# Patient Record
Sex: FEMALE | Race: WHITE | NOT HISPANIC OR LATINO | Employment: OTHER | URBAN - METROPOLITAN AREA
[De-identification: names, ages, dates, MRNs, and addresses within clinical notes are randomized per-mention and may not be internally consistent; named-entity substitution may affect disease eponyms.]

---

## 2019-04-01 PROBLEM — K21.9 GERD (GASTROESOPHAGEAL REFLUX DISEASE): Status: ACTIVE | Noted: 2019-04-01

## 2019-04-01 PROBLEM — C50.912 MALIGNANT NEOPLASM OF LEFT FEMALE BREAST: Status: ACTIVE | Noted: 2019-04-01

## 2019-04-01 PROBLEM — I50.21 ACUTE SYSTOLIC (CONGESTIVE) HEART FAILURE: Status: ACTIVE | Noted: 2019-04-01

## 2019-04-02 PROBLEM — E87.6 HYPOKALEMIA: Status: ACTIVE | Noted: 2019-04-02

## 2019-04-03 PROBLEM — I50.31 ACUTE DIASTOLIC (CONGESTIVE) HEART FAILURE: Status: ACTIVE | Noted: 2019-04-01

## 2019-04-04 ENCOUNTER — TELEPHONE (OUTPATIENT)
Dept: CARDIOLOGY | Facility: CLINIC | Age: 79
End: 2019-04-04

## 2019-04-04 NOTE — TELEPHONE ENCOUNTER
----- Message from Marti Jerez sent at 4/4/2019  2:06 PM CDT -----  Contact: [facility]  Type:  Sooner Apoointment Request    Caller is requesting a sooner appointment.  Caller declined first available appointment listed below.  Caller will not accept being placed on the waitlist and is requesting a message be sent to doctor.    Name of Caller:  [facility]  When is the first available appointment?  7/3/19  Symptoms:  Needs two week follow up from stay [facility] discharge 4/4/19; Dx angiogram  Best Call Back Number:  [phone]  Additional Information:  Please call patient with appointment. Thanks!

## 2019-07-08 ENCOUNTER — TELEPHONE (OUTPATIENT)
Dept: CARDIOLOGY | Facility: CLINIC | Age: 79
End: 2019-07-08

## 2019-07-08 NOTE — TELEPHONE ENCOUNTER
----- Message from Marti Jerez sent at 7/8/2019  1:27 PM CDT -----  Contact: Grace  Type: Needs Medical Advice    Who Called:  patient  Best Call Back Number: [phone]  Additional Information:  Calling as has hives and also has an appointment scheduled for Wednesday 7/10/19. Question is does Dr Trevino want to see her with this situation going on or needs to reschedule. Thanks!

## 2019-07-10 ENCOUNTER — OFFICE VISIT (OUTPATIENT)
Dept: CARDIOLOGY | Facility: CLINIC | Age: 79
End: 2019-07-10
Payer: MEDICARE

## 2019-07-10 VITALS
DIASTOLIC BLOOD PRESSURE: 81 MMHG | BODY MASS INDEX: 28.75 KG/M2 | HEART RATE: 69 BPM | SYSTOLIC BLOOD PRESSURE: 131 MMHG | HEIGHT: 63 IN | WEIGHT: 162.25 LBS

## 2019-07-10 DIAGNOSIS — R94.39 ABNORMAL NUCLEAR STRESS TEST: Primary | ICD-10-CM

## 2019-07-10 DIAGNOSIS — I70.0 ATHEROSCLEROSIS OF AORTA: ICD-10-CM

## 2019-07-10 DIAGNOSIS — R09.02 HYPOXIA: ICD-10-CM

## 2019-07-10 PROCEDURE — 1101F PT FALLS ASSESS-DOCD LE1/YR: CPT | Mod: CPTII,S$GLB,, | Performed by: INTERNAL MEDICINE

## 2019-07-10 PROCEDURE — 99214 PR OFFICE/OUTPT VISIT, EST, LEVL IV, 30-39 MIN: ICD-10-PCS | Mod: S$GLB,,, | Performed by: INTERNAL MEDICINE

## 2019-07-10 PROCEDURE — 99999 PR PBB SHADOW E&M-EST. PATIENT-LVL III: CPT | Mod: PBBFAC,,, | Performed by: INTERNAL MEDICINE

## 2019-07-10 PROCEDURE — 99999 PR PBB SHADOW E&M-EST. PATIENT-LVL III: ICD-10-PCS | Mod: PBBFAC,,, | Performed by: INTERNAL MEDICINE

## 2019-07-10 PROCEDURE — 99214 OFFICE O/P EST MOD 30 MIN: CPT | Mod: S$GLB,,, | Performed by: INTERNAL MEDICINE

## 2019-07-10 PROCEDURE — 1101F PR PT FALLS ASSESS DOC 0-1 FALLS W/OUT INJ PAST YR: ICD-10-PCS | Mod: CPTII,S$GLB,, | Performed by: INTERNAL MEDICINE

## 2019-07-10 RX ORDER — FUROSEMIDE 20 MG/1
20 TABLET ORAL DAILY PRN
Qty: 90 TABLET | Refills: 6 | Status: SHIPPED | OUTPATIENT
Start: 2019-07-10 | End: 2021-01-12

## 2019-07-10 RX ORDER — CARVEDILOL 3.12 MG/1
3.12 TABLET ORAL 2 TIMES DAILY
Qty: 180 TABLET | Refills: 6 | Status: SHIPPED | OUTPATIENT
Start: 2019-07-10 | End: 2020-08-24

## 2019-07-10 RX ORDER — TRIAMTERENE/HYDROCHLOROTHIAZID 37.5-25 MG
1 TABLET ORAL DAILY
Qty: 90 TABLET | Refills: 5 | Status: SHIPPED | OUTPATIENT
Start: 2019-07-10 | End: 2019-07-10 | Stop reason: SDUPTHER

## 2019-07-10 RX ORDER — CARVEDILOL 3.12 MG/1
3.12 TABLET ORAL 2 TIMES DAILY
Qty: 180 TABLET | Refills: 6 | Status: SHIPPED | OUTPATIENT
Start: 2019-07-10 | End: 2019-07-10 | Stop reason: SDUPTHER

## 2019-07-10 RX ORDER — TRIAMTERENE/HYDROCHLOROTHIAZID 37.5-25 MG
1 TABLET ORAL DAILY
Qty: 90 TABLET | Refills: 5 | Status: SHIPPED | OUTPATIENT
Start: 2019-07-10 | End: 2021-01-12

## 2019-07-10 NOTE — PROGRESS NOTES
Subjective:    Patient ID:  Grace Herrera is a 78 y.o. female who presents for follow-up of CHF F/U      HPI  Here for follow up of recent hospital stay due to CHF with neg [facility]. Still has LOPEZ. No angina. Mild LE edema with increased Na use.       Review of Systems   Constitution: Negative for malaise/fatigue.   Eyes: Negative for blurred vision.   Cardiovascular: Negative for chest pain, claudication, cyanosis, dyspnea on exertion, irregular heartbeat, leg swelling, near-syncope, orthopnea, palpitations, paroxysmal nocturnal dyspnea and syncope.   Respiratory: Negative for cough and shortness of breath.    Hematologic/Lymphatic: Does not bruise/bleed easily.   Musculoskeletal: Negative for back pain, falls, joint pain, muscle cramps, muscle weakness and myalgias.   Gastrointestinal: Negative for abdominal pain, change in bowel habit, nausea and vomiting.   Genitourinary: Negative for urgency.   Neurological: Negative for dizziness, focal weakness and light-headedness.       Past Medical History:   Diagnosis Date    Abnormal nuclear stress test     4/19 [facility]- [place]    Arthritis     Breast cancer in female     Chronic low back pain     Depression     Encounter for blood transfusion     GERD (gastroesophageal reflux disease)     Hypertension     Insomnia     Malignant neoplasm of left female breast 4/1/2019    Osteoporosis           Objective:     Vitals:    07/10/19 0813   BP: 131/81   Pulse: 69        Physical Exam   Constitutional: She is oriented to person, place, and time. She appears well-developed and well-nourished.   Neck: Normal range of motion. No JVD present.   Cardiovascular: Normal rate, regular rhythm, normal heart sounds and intact distal pulses.   Pulmonary/Chest: Effort normal and breath sounds normal.   Neurological: She is alert and oriented to person, place, and time.   Skin: Skin is warm and dry.   Psychiatric: She has a normal mood and affect.   Nursing note and vitals reviewed.             ..    Chemistry        Component Value Date/Time     04/03/2019 0626    K 3.4 (L) 04/03/2019 0626     04/03/2019 0626    CO2 27 04/03/2019 0626    BUN 23 (H) 04/03/2019 0626    CREATININE 1.09 04/03/2019 0626     04/03/2019 0626        Component Value Date/Time    CALCIUM 8.3 (L) 04/03/2019 0626    ALKPHOS 64 04/01/2019 1836    AST 22 04/01/2019 1836    ALT 12 04/01/2019 1836    BILITOT 0.6 04/01/2019 1836    ESTGFRAFRICA 55 (A) 04/03/2019 0626    EGFRNONAA 48 (A) 04/03/2019 0626            ..  Lab Results   Component Value Date    CHOL 231 (H) 04/01/2019     Lab Results   Component Value Date    HDL 51 04/01/2019     Lab Results   Component Value Date    LDLCALC 134.2 04/01/2019     Lab Results   Component Value Date    TRIG 229 (H) 04/01/2019     Lab Results   Component Value Date    CHOLHDL 22.1 04/01/2019     ..  Lab Results   Component Value Date    WBC 7.71 04/01/2019    HGB 11.5 (L) 04/01/2019    HCT 37.5 04/01/2019    MCV 87 04/01/2019     04/01/2019       Test(s) Reviewed  I have reviewed the following in detail:  [] Stress test   [] Angiography   [] Echocardiogram   [x] Labs   [] Other:       Assessment:         ICD-10-CM ICD-9-CM   1. Abnormal nuclear stress test R94.39 794.39   2. Hypoxia R09.02 799.02     Problem List Items Addressed This Visit     Hypoxia    Abnormal nuclear stress test - Primary    Overview     4/19 [facility]- [place]                Plan:           Return to clinic 9 months   Low level/low impact aerobic exercise 5x's/wk. Heart healthy diet and risk factor modification.    See labs and med orders.  Decrease coreg add triamterene  Educated on titratration of diuretic to 2 lb weight gain in 24 hrs, control salt intake and leg elevation.     Portions of this note may have been created with voice recognition software.  Grammatical, syntax and spelling errors may be inevitable.

## 2020-05-12 ENCOUNTER — TELEPHONE (OUTPATIENT)
Dept: CARDIOLOGY | Facility: CLINIC | Age: 80
End: 2020-05-12

## 2020-08-24 RX ORDER — CARVEDILOL 3.12 MG/1
TABLET ORAL
Qty: 180 TABLET | Refills: 4 | Status: SHIPPED | OUTPATIENT
Start: 2020-08-24 | End: 2020-12-22

## 2020-09-22 NOTE — PROGRESS NOTES
"  INITIAL CONSULTATION     DATE: 09/24/2020  Patient Name: Grace Herrera  Referred by: Jose Elias Monzon Jr., MD  Reason for referral: breast cancer      Subjective:       Patient ID: Grace Herrera is a 79 y.o. female.    Chief Complaint: Establish Care    HPI     Oncology and medical history for review prior to visit:  01/24/2013-lung biopsy - granulomatous disease  -   2/2012 - abnormal MMG   04/01/2012-left breast lumpectomy and ALND -   pT1c N3a - ER+, MS-, HER2- Low proliferative rate  Dose dense chemo - peripehral neuroapthy and docetaxol substituted for paclitaxel. Patient reports at initial visit  S/p radiation  2013 - started anastrozole    03/03/2020-medical oncology visit-mention of dyspnea associated with heart failure managed by cardiology, stable weight, urticaria times year that has improved.  She remains on anastrozole, denosumab, calcium and vitamin-D    [facility]   She presents today to establish care closer to home for her breast cancer care.   Remains on arimidex with plan indefinitely.   She reports stopped prolia, this ws very expensive so she stopped. States she was on two years, stopped prior to 3/2020.  She does take Vit D 50,000 IU once a week.     Reports her biggest issue is:  Urticaria/hives - started 2016. She reports on and off since then  - had been to allergist and 2 dermatologist. Take antihistamines is what has been recommended but she reports cannot take these, she has ended up in the hopsital from this before. She had passing out episode and went to hospital she attributes to the anti-histamine. She reports very frustrated with this and is tearful, itches so bad she scratches and bleed. She reports has tried topical and has tried "everything". Steroids helps very short duration. She reports she cant sleep due to this as well. She gets 2 hours of sleep at night and awake with itching. She reports her back itching gets so severe as well. She " "states she has not had allergy testing and has thought about seeing someone else. Also head and neck - scratches until she bleeds. She is very upset about this today and encouraged    She reports limited ROM left shoulder related to surgery, both shoulders "frozen", she declines to see a doctor, very limited mobility.    She reports has had an angiogram in past with minimal blockage. She has CHF that is "mild" per her report. She reports limited on activity - walking to mail box she gets SOB. She monitors salt intake and low water intake. She does get swelling of the ankles. On maxide.     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   No smoking or drinking.   Has a son she is close to who lives in [place]. Her daughter lives there also as well as second son.     Past Medical History:   Diagnosis Date    Abnormal nuclear stress test     4/19 [facility] [place]    Arthritis     Breast cancer in female     Chronic low back pain     Depression     Encounter for blood transfusion     GERD (gastroesophageal reflux disease)     Hypertension     Insomnia     Malignant neoplasm of left female breast 4/1/2019    Osteoporosis      Past Surgical History:   Procedure Laterality Date    ADENOIDECTOMY      APPENDECTOMY      BREAST LUMPECTOMY Left     CHOLECYSTECTOMY      CORONARY ANGIOGRAPHY N/A 4/4/2019    Procedure: ANGIOGRAM, CORONARY ARTERY;  Surgeon: You Trevino MD;  Location: [facility];  Service: Cardiology;  Laterality: N/A;    EYE SURGERY      HYSTERECTOMY      JOINT REPLACEMENT Bilateral     knees    LYMPH NODE BIOPSY      SURGICAL REMOVAL OF LYMPH NODE Left     near left breast    TONSILLECTOMY       Social History     Socioeconomic History    Marital status: Single     Spouse name: Not on file    Number of children: Not on file    Years of education: Not on file    " Highest education level: Not on file   Occupational History    Not on file   Social Needs    Financial resource strain: Not on file    Food insecurity     Worry: Not on file     Inability: Not on file    Transportation needs     Medical: Not on file     Non-medical: Not on file   Tobacco Use    Smoking status: Former Smoker     Types: Cigarettes    Smokeless tobacco: Never Used   Substance and Sexual Activity    Alcohol use: Not Currently    Drug use: Never    Sexual activity: Not on file   Lifestyle    Physical activity     Days per week: Not on file     Minutes per session: Not on file    Stress: Not on file   Relationships    Social connections     Talks on phone: Not on file     Gets together: Not on file     Attends Yazdanism service: Not on file     Active member of club or organization: Not on file     Attends meetings of clubs or organizations: Not on file     Relationship status: Not on file   Other Topics Concern    Not on file   Social History Narrative    Not on file     History reviewed. No pertinent family history.    Current Outpatient Medications   Medication Sig Dispense Refill    acetaminophen (TYLENOL EXTRA STRENGTH) 500 MG tablet Take 500 mg by mouth every 6 (six) hours as needed for Pain.      anastrozole (ARIMIDEX) 1 mg Tab Take 1 mg by mouth once daily.      carvediloL (COREG) 3.125 MG tablet TAKE 1 TABLET TWICE DAILY 180 tablet 4    cetirizine (ZYRTEC) 10 MG tablet Take 10 mg by mouth daily as needed for Allergies.      ergocalciferol (ERGOCALCIFEROL) 50,000 unit Cap Take 50,000 Units by mouth every Wednesday.      esomeprazole (NEXIUM) 40 MG capsule Take 1 capsule by mouth before breakfast.      ibuprofen (ADVIL,MOTRIN) 200 MG tablet Take 200 mg by mouth every 6 (six) hours as needed for Pain.      LORazepam (ATIVAN) 1 MG tablet Take 1 mg by mouth nightly as needed for Insomnia.      multivit-min/iron/folic/lutein (CENTRUM SILVER WOMEN ORAL) Take by mouth once daily.       prochlorperazine (COMPAZINE) 10 MG tablet Take 10 mg by mouth daily as needed.      aspirin (ECOTRIN) 81 MG EC tablet Take 1 tablet (81 mg total) by mouth once daily.  0    biotin 1 mg Cap Take 5,000 mcg by mouth daily as needed.      denosumab (PROLIA) 60 mg/mL Syrg Inject 60 mg into the skin.      furosemide (LASIX) 20 MG tablet Take 1 tablet (20 mg total) by mouth daily as needed. 90 tablet 6    sertraline (ZOLOFT) 50 MG tablet Take 50 mg by mouth once daily.      triamterene-hydrochlorothiazide 37.5-25 mg (MAXZIDE-25) 37.5-25 mg per tablet Take 1 tablet by mouth once daily. 90 tablet 5     No current facility-administered medications for this visit.      ALLERGIES REVIEWED    Review of Systems    Constitutional: Negative for activity change, appetite change, positive fatigue, fever and unexpected weight change.   HENT: Negative for mouth sores and sore throat.    Respiratory: Negative for cough and +shortness of breath.    Cardiovascular: Negative for chest pain, palpitations and +leg swelling.   Gastrointestinal: Negative for abdominal pain, constipation and diarrhea.   Genitourinary: Negative for dysuria and frequency.   Musculoskeletal: +for back pain and joint swelling.   Neurological: +for weakness, light-headedness and numbness.   Hematological: Does not bruise/bleed easily.   Skin: no rash, no lesions, no itching    Objective:      BP (!) 152/85 (BP Location: Right arm, Patient Position: Sitting)   Pulse 87   Temp 97.4 °F (36.3 °C) (Temporal)   Wt 70.5 kg (155 lb 6.8 oz)   SpO2 96%   BMI 26.68 kg/m²    Wt Readings from Last 25 Encounters:   09/23/20 70.5 kg (155 lb 6.8 oz)   08/07/20 68 kg (150 lb)   11/28/19 71.8 kg (158 lb 4.6 oz)   07/10/19 73.6 kg (162 lb 4.1 oz)   04/04/19 75.3 kg (166 lb 0.1 oz)       Physical Exam    Constitutional: patient is oriented to person, place, and time. No distress. Well appearing. Overweight.   HENT: Mouth/Throat: Oropharynx is clear and moist. No  oropharyngeal exudate.   Eyes: Conjunctivae and EOM are normal.   Neck: Normal range of motion. Neck supple.   Cardiovascular: Normal rate, regular rhythm, normal heart sounds and intact distal pulses.    Pulmonary/Chest: Effort normal and breath sounds normal. she has no wheezes.   has no rales.    Abdominal: Soft. Bowel sounds are normal. he exhibits no distension. There is no tenderness.   Musculoskeletal: patient exhibits no edema or tenderness.   Lymphadenopathy: Pt has no cervical adenopathy. no supraclavicular adenopathy. No axillary LAD.   Neurological: Pt is alert and oriented to person, place, and time. normal strength. No cranial nerve deficit or sensory deficit.   Skin: Skin is warm and dry. No rash noted. No erythema. Excoriations to back due to urticaria noted.   Psychiatric: Pt has a normal mood and affect. speech is normal.   Nursing note and vitals reviewed.      Assessment:       1. History of breast cancer    2. SOB (shortness of breath)    3. Chronic heart failure, unspecified heart failure type    4. Urticaria        ECOG SCORE         ECOG 1    Patient is a  79 y.o. female here with diagnosis breast cancer in 2012, locally advanced stage III ER/VA+. She remains with PEDRO on AI which is good. She declines further bone directed therapy, stopped prolia, but we did discuss potential benefit of zometa especially for breast cancer survivors, she is open to this and will discuss next visit, will continue Vit D/calcium    Reviewed her multiple medical problems and their mgmt. She feels there is no further way to optimize her chronic pain and debility with her back and shoulders, though this is very limiting in physical activity.    Her urticaria is a major issue with QOL and effects her every day and at night causing insmonia and she is tearful about this. Recommended continue follow-up with dermatology for further care, she is going to pursue this.           Plan:       Grace was seen today for  establish care.    Diagnoses and all orders for this visit:    History of breast cancer    SOB (shortness of breath)    Chronic heart failure, unspecified heart failure type    Urticaria            PLAN:    MMG at [facility] bilateral screening (hx left breast ca)    MD vis in 4 mo, January     Exercise/nutrition  Important to keep follow-up with PCP and cardiology    Discussed plan above in great detail with patient and all questions answered to their satisfaction. Proceed with plan above.       Thank you for the referral. Please call with any questions.           Tanya Ambrocio M.D.  Hematology Oncology  [facility]

## 2020-09-23 ENCOUNTER — OFFICE VISIT (OUTPATIENT)
Dept: HEMATOLOGY/ONCOLOGY | Facility: CLINIC | Age: 80
End: 2020-09-23
Payer: MEDICARE

## 2020-09-23 VITALS
BODY MASS INDEX: 26.68 KG/M2 | WEIGHT: 155.44 LBS | HEART RATE: 87 BPM | OXYGEN SATURATION: 96 % | DIASTOLIC BLOOD PRESSURE: 85 MMHG | SYSTOLIC BLOOD PRESSURE: 152 MMHG | TEMPERATURE: 97 F

## 2020-09-23 DIAGNOSIS — L50.9 URTICARIA: ICD-10-CM

## 2020-09-23 DIAGNOSIS — R06.02 SOB (SHORTNESS OF BREATH): ICD-10-CM

## 2020-09-23 DIAGNOSIS — Z85.3 HISTORY OF BREAST CANCER: Primary | ICD-10-CM

## 2020-09-23 DIAGNOSIS — I50.9 CHRONIC HEART FAILURE, UNSPECIFIED HEART FAILURE TYPE: ICD-10-CM

## 2020-09-23 PROCEDURE — 1159F MED LIST DOCD IN RCRD: CPT | Mod: S$GLB,,, | Performed by: INTERNAL MEDICINE

## 2020-09-23 PROCEDURE — 1159F PR MEDICATION LIST DOCUMENTED IN MEDICAL RECORD: ICD-10-PCS | Mod: S$GLB,,, | Performed by: INTERNAL MEDICINE

## 2020-09-23 PROCEDURE — 1125F PR PAIN SEVERITY QUANTIFIED, PAIN PRESENT: ICD-10-PCS | Mod: S$GLB,,, | Performed by: INTERNAL MEDICINE

## 2020-09-23 PROCEDURE — 1100F PTFALLS ASSESS-DOCD GE2>/YR: CPT | Mod: CPTII,S$GLB,, | Performed by: INTERNAL MEDICINE

## 2020-09-23 PROCEDURE — 99204 PR OFFICE/OUTPT VISIT, NEW, LEVL IV, 45-59 MIN: ICD-10-PCS | Mod: S$GLB,,, | Performed by: INTERNAL MEDICINE

## 2020-09-23 PROCEDURE — 3288F PR FALLS RISK ASSESSMENT DOCUMENTED: ICD-10-PCS | Mod: CPTII,S$GLB,, | Performed by: INTERNAL MEDICINE

## 2020-09-23 PROCEDURE — 99204 OFFICE O/P NEW MOD 45 MIN: CPT | Mod: S$GLB,,, | Performed by: INTERNAL MEDICINE

## 2020-09-23 PROCEDURE — 99999 PR PBB SHADOW E&M-EST. PATIENT-LVL IV: ICD-10-PCS | Mod: PBBFAC,,, | Performed by: INTERNAL MEDICINE

## 2020-09-23 PROCEDURE — 1125F AMNT PAIN NOTED PAIN PRSNT: CPT | Mod: S$GLB,,, | Performed by: INTERNAL MEDICINE

## 2020-09-23 PROCEDURE — 3288F FALL RISK ASSESSMENT DOCD: CPT | Mod: CPTII,S$GLB,, | Performed by: INTERNAL MEDICINE

## 2020-09-23 PROCEDURE — 99999 PR PBB SHADOW E&M-EST. PATIENT-LVL IV: CPT | Mod: PBBFAC,,, | Performed by: INTERNAL MEDICINE

## 2020-09-23 PROCEDURE — 1100F PR PT FALLS ASSESS DOC 2+ FALLS/FALL W/INJURY/YR: ICD-10-PCS | Mod: CPTII,S$GLB,, | Performed by: INTERNAL MEDICINE

## 2020-09-23 NOTE — LETTER
September 24, 2020      Jose Elias Monzon Jr., MD  [address]  [address], [address]  [place] [address]           [facility] [address], [address]  [facility] [address]  Phone: [phone]  Fax: [phone]          Patient: Grace Herrera   MR Number: [id number]   YOB: 1940   Date of Visit: 9/23/2020       Dear Dr. Jose Elias Monzon Jr.:    Thank you for referring Grace Herrera to me for evaluation. Attached you will find relevant portions of my assessment and plan of care.    If you have questions, please do not hesitate to call me. I look forward to following Grace Herrera along with you.    Sincerely,    Tanya Ambrocio MD    Enclosure  CC:  No Recipients    If you would like to receive this communication electronically, please contact [email or web address] or [phone] to request more information on [employer] access.    For providers and/or their staff who would like to refer a patient to [facility], please contact us through our one-stop-shop provider referral line, [facility], at [phone].    If you feel you have received this communication in error or would no longer like to receive these types of communications, please e-mail [email or web address]

## 2020-12-07 ENCOUNTER — TELEPHONE (OUTPATIENT)
Dept: CARDIOLOGY | Facility: CLINIC | Age: 80
End: 2020-12-07

## 2020-12-07 NOTE — TELEPHONE ENCOUNTER
----- Message from Monica Mariano sent at 12/7/2020  9:02 AM CST -----  Type: Needs Medical Advice  Who Called:  Patient  Best Call Back Number: [phone] (home)   Additional Information: the pt said she needs a nurse to call her she needs advice In regards to some test that she did have in Epic and she wants to add some test

## 2020-12-22 ENCOUNTER — OFFICE VISIT (OUTPATIENT)
Dept: CARDIOLOGY | Facility: CLINIC | Age: 80
End: 2020-12-22
Payer: MEDICARE

## 2020-12-22 VITALS
WEIGHT: 156.94 LBS | HEART RATE: 86 BPM | SYSTOLIC BLOOD PRESSURE: 151 MMHG | HEIGHT: 64 IN | DIASTOLIC BLOOD PRESSURE: 90 MMHG | BODY MASS INDEX: 26.79 KG/M2

## 2020-12-22 DIAGNOSIS — R94.39 ABNORMAL NUCLEAR STRESS TEST: Primary | ICD-10-CM

## 2020-12-22 DIAGNOSIS — I50.21 ACUTE SYSTOLIC (CONGESTIVE) HEART FAILURE: ICD-10-CM

## 2020-12-22 PROCEDURE — 99214 OFFICE O/P EST MOD 30 MIN: CPT | Mod: S$GLB,,, | Performed by: INTERNAL MEDICINE

## 2020-12-22 PROCEDURE — 99999 PR PBB SHADOW E&M-EST. PATIENT-LVL III: ICD-10-PCS | Mod: PBBFAC,,, | Performed by: INTERNAL MEDICINE

## 2020-12-22 PROCEDURE — 1159F PR MEDICATION LIST DOCUMENTED IN MEDICAL RECORD: ICD-10-PCS | Mod: S$GLB,,, | Performed by: INTERNAL MEDICINE

## 2020-12-22 PROCEDURE — 1126F AMNT PAIN NOTED NONE PRSNT: CPT | Mod: S$GLB,,, | Performed by: INTERNAL MEDICINE

## 2020-12-22 PROCEDURE — 1101F PR PT FALLS ASSESS DOC 0-1 FALLS W/OUT INJ PAST YR: ICD-10-PCS | Mod: CPTII,S$GLB,, | Performed by: INTERNAL MEDICINE

## 2020-12-22 PROCEDURE — 99214 PR OFFICE/OUTPT VISIT, EST, LEVL IV, 30-39 MIN: ICD-10-PCS | Mod: S$GLB,,, | Performed by: INTERNAL MEDICINE

## 2020-12-22 PROCEDURE — 1159F MED LIST DOCD IN RCRD: CPT | Mod: S$GLB,,, | Performed by: INTERNAL MEDICINE

## 2020-12-22 PROCEDURE — 3288F PR FALLS RISK ASSESSMENT DOCUMENTED: ICD-10-PCS | Mod: CPTII,S$GLB,, | Performed by: INTERNAL MEDICINE

## 2020-12-22 PROCEDURE — 99999 PR PBB SHADOW E&M-EST. PATIENT-LVL III: CPT | Mod: PBBFAC,,, | Performed by: INTERNAL MEDICINE

## 2020-12-22 PROCEDURE — 1101F PT FALLS ASSESS-DOCD LE1/YR: CPT | Mod: CPTII,S$GLB,, | Performed by: INTERNAL MEDICINE

## 2020-12-22 PROCEDURE — 3288F FALL RISK ASSESSMENT DOCD: CPT | Mod: CPTII,S$GLB,, | Performed by: INTERNAL MEDICINE

## 2020-12-22 PROCEDURE — 1126F PR PAIN SEVERITY QUANTIFIED, NO PAIN PRESENT: ICD-10-PCS | Mod: S$GLB,,, | Performed by: INTERNAL MEDICINE

## 2020-12-22 RX ORDER — METOPROLOL SUCCINATE 25 MG/1
25 TABLET, EXTENDED RELEASE ORAL DAILY
Qty: 90 TABLET | Refills: 6 | Status: SHIPPED | OUTPATIENT
Start: 2020-12-22 | End: 2020-12-30 | Stop reason: SINTOL

## 2020-12-22 RX ORDER — METOPROLOL SUCCINATE 25 MG/1
25 TABLET, EXTENDED RELEASE ORAL DAILY
Qty: 90 TABLET | Refills: 6 | Status: SHIPPED | OUTPATIENT
Start: 2020-12-22 | End: 2020-12-22 | Stop reason: SDUPTHER

## 2020-12-22 NOTE — PROGRESS NOTES
Subjective:    Patient ID:  Grace Herrera is a 80 y.o. female who presents for follow-up of CHF f/u and Shortness of Breath      HPI  Here for follow up of recent hospital stay due to CHF with neg [facility]. Limited activity stable LOPEZ. No CP. Has had episodes of hypotension.    Review of Systems   Constitution: Negative for malaise/fatigue.   Eyes: Negative for blurred vision.   Cardiovascular: Negative for chest pain, claudication, cyanosis, dyspnea on exertion, irregular heartbeat, leg swelling, near-syncope, orthopnea, palpitations, paroxysmal nocturnal dyspnea and syncope.   Respiratory: Negative for cough and shortness of breath.    Hematologic/Lymphatic: Does not bruise/bleed easily.   Musculoskeletal: Negative for back pain, falls, joint pain, muscle cramps, muscle weakness and myalgias.   Gastrointestinal: Negative for abdominal pain, change in bowel habit, nausea and vomiting.   Genitourinary: Negative for urgency.   Neurological: Negative for dizziness, focal weakness and light-headedness.       Past Medical History:   Diagnosis Date    Abnormal nuclear stress test     4/19 [facility] [place]    Arthritis     Breast cancer in female     Chronic low back pain     Depression     Encounter for blood transfusion     GERD (gastroesophageal reflux disease)     Hypertension     Insomnia     Malignant neoplasm of left female breast 4/1/2019    Osteoporosis      There are no hospital problems to display for this patient.       Objective:     Vitals:    12/22/20 1338   BP: (!) 151/90   Pulse: 86        Physical Exam   Constitutional: She is oriented to person, place, and time. She appears well-developed and well-nourished.   Neck: Normal range of motion. No JVD present.   Cardiovascular: Normal rate, regular rhythm, normal heart sounds and intact distal pulses.   Pulmonary/Chest: Effort normal and breath sounds normal.   Neurological: She is alert and oriented to person, place, and time.   Skin: Skin is warm and dry.    Psychiatric: She has a normal mood and affect.   Nursing note and vitals reviewed.            ..    Chemistry        Component Value Date/Time     08/07/2020 0752    K 3.7 08/07/2020 0752     08/07/2020 0752    CO2 28 08/07/2020 0752    BUN 12 08/07/2020 0752    CREATININE 0.93 08/07/2020 0752     (H) 08/07/2020 0752        Component Value Date/Time    CALCIUM 9.2 08/07/2020 0752    ALKPHOS 81 08/07/2020 0752    AST 24 08/07/2020 0752    ALT 12 08/07/2020 0752    BILITOT 1.1 08/07/2020 0752    ESTGFRAFRICA >60 08/07/2020 0752    EGFRNONAA 58 (A) 08/07/2020 0752            ..  Lab Results   Component Value Date    CHOL 231 (H) 04/01/2019     Lab Results   Component Value Date    HDL 51 04/01/2019     Lab Results   Component Value Date    LDLCALC 134.2 04/01/2019     Lab Results   Component Value Date    TRIG 229 (H) 04/01/2019     Lab Results   Component Value Date    CHOLHDL 22.1 04/01/2019     ..  Lab Results   Component Value Date    WBC 8.68 08/07/2020    HGB 12.2 08/07/2020    HCT 39.3 08/07/2020    MCV 89 08/07/2020     08/07/2020       Test(s) Reviewed  I have reviewed the following in detail:  [] Stress test   [] Angiography   [x] Echocardiogram   [x] Labs   [] Other:       Assessment:         ICD-10-CM ICD-9-CM   1. Abnormal nuclear stress test  R94.39 794.39   2. Acute systolic (congestive) heart failure  I50.21 428.21     428.0     Problem List Items Addressed This Visit     Acute systolic (congestive) heart failure    Abnormal nuclear stress test - Primary    Overview     4/19 [facility]- [place]                Plan:           Return to clinic 9 months   Low level/low impact aerobic exercise 5x's/wk. Heart healthy diet and risk factor modification.    See labs and med orders.  Dc coreg use toprol half tab QPM  cfd 4/21    Portions of this note may have been created with voice recognition software.  Grammatical, syntax and spelling errors may be inevitable.

## 2020-12-29 ENCOUNTER — TELEPHONE (OUTPATIENT)
Dept: CARDIOLOGY | Facility: CLINIC | Age: 80
End: 2020-12-29

## 2020-12-29 NOTE — TELEPHONE ENCOUNTER
Spoke to pt over the phone, pt states that since she started taking metoprolol she is not  beeing able to sleep , she is felling very nervous at night. Pt would like to know if thi is a normal reaction or if she needs to stop the medication? Please advise thank you

## 2020-12-29 NOTE — TELEPHONE ENCOUNTER
----- Message from Rachel Parra sent at 12/29/2020  8:03 AM CST -----  Regarding: Advice  Contact: pt  Type: Needs Medical Advice    Who Called:  patient  Symptoms (please be specific):  n/a  How long has patient had these symptoms:  n/a  Pharmacy name and phone #:  n/a  Best Call Back Number: [phone] (home)     Additional Information: asking for a call regarding new beta blocker med and effects during the night. Please call to advise

## 2020-12-29 NOTE — TELEPHONE ENCOUNTER
"Spoke to pt over the phone inform her " No this medicine helps you sleep ', per dr Trevino. Pt verbalized understanding. Pt states that she will be taking the medication today for the second time ,a she will be calling back tomorrow to let us know if she still experiencing this problem, per pt.  "

## 2020-12-30 NOTE — TELEPHONE ENCOUNTER
Spoke to pt and advised to stop metoprolol and start verapamil 100mg QPM; pt expressed understanding

## 2020-12-30 NOTE — TELEPHONE ENCOUNTER
----- Message from Rylan Dalton sent at 12/30/2020  8:14 AM CST -----  Type: Needs Medical Advice  Who Called:  patient  Symptoms (please be specific):    How long has patient had these symptoms:    Pharmacy name and phone #:    Best Call Back Number: [phone]  Additional Information: requesting a call back stated that she is not feeling well today

## 2020-12-30 NOTE — TELEPHONE ENCOUNTER
Please advise: Pt took metoprolol again last night and was unable to sleep. Very anxious and is SOB when gets up to the bathroom. BP this morning 159/110, 149/98, 133/86. Pt does not want to be on a beta blocker.

## 2020-12-31 RX ORDER — VERAPAMIL HYDROCHLORIDE 100 MG/1
100 CAPSULE, EXTENDED RELEASE ORAL DAILY
Qty: 90 CAPSULE | Refills: 4 | Status: SHIPPED | OUTPATIENT
Start: 2020-12-31 | End: 2021-03-11 | Stop reason: ALTCHOICE

## 2021-01-12 ENCOUNTER — OFFICE VISIT (OUTPATIENT)
Dept: HEMATOLOGY/ONCOLOGY | Facility: CLINIC | Age: 81
End: 2021-01-12
Payer: MEDICARE

## 2021-01-12 VITALS
WEIGHT: 158.94 LBS | SYSTOLIC BLOOD PRESSURE: 192 MMHG | HEIGHT: 64 IN | RESPIRATION RATE: 18 BRPM | DIASTOLIC BLOOD PRESSURE: 97 MMHG | BODY MASS INDEX: 27.13 KG/M2 | OXYGEN SATURATION: 98 % | HEART RATE: 84 BPM

## 2021-01-12 DIAGNOSIS — Z85.3 HISTORY OF BREAST CANCER: Primary | ICD-10-CM

## 2021-01-12 DIAGNOSIS — L50.9 URTICARIA: ICD-10-CM

## 2021-01-12 DIAGNOSIS — I50.9 CHRONIC HEART FAILURE, UNSPECIFIED HEART FAILURE TYPE: ICD-10-CM

## 2021-01-12 DIAGNOSIS — E55.9 VITAMIN D DEFICIENCY: ICD-10-CM

## 2021-01-12 DIAGNOSIS — Z87.39 HISTORY OF OSTEOPOROSIS: ICD-10-CM

## 2021-01-12 DIAGNOSIS — R06.02 SOB (SHORTNESS OF BREATH): ICD-10-CM

## 2021-01-12 DIAGNOSIS — Z79.811 AROMATASE INHIBITOR USE: ICD-10-CM

## 2021-01-12 PROCEDURE — 1126F PR PAIN SEVERITY QUANTIFIED, NO PAIN PRESENT: ICD-10-PCS | Mod: S$GLB,,, | Performed by: INTERNAL MEDICINE

## 2021-01-12 PROCEDURE — 1101F PT FALLS ASSESS-DOCD LE1/YR: CPT | Mod: CPTII,S$GLB,, | Performed by: INTERNAL MEDICINE

## 2021-01-12 PROCEDURE — 99999 PR PBB SHADOW E&M-EST. PATIENT-LVL IV: ICD-10-PCS | Mod: PBBFAC,,, | Performed by: INTERNAL MEDICINE

## 2021-01-12 PROCEDURE — 99214 OFFICE O/P EST MOD 30 MIN: CPT | Mod: S$GLB,,, | Performed by: INTERNAL MEDICINE

## 2021-01-12 PROCEDURE — 3288F FALL RISK ASSESSMENT DOCD: CPT | Mod: CPTII,S$GLB,, | Performed by: INTERNAL MEDICINE

## 2021-01-12 PROCEDURE — 1126F AMNT PAIN NOTED NONE PRSNT: CPT | Mod: S$GLB,,, | Performed by: INTERNAL MEDICINE

## 2021-01-12 PROCEDURE — 1159F MED LIST DOCD IN RCRD: CPT | Mod: S$GLB,,, | Performed by: INTERNAL MEDICINE

## 2021-01-12 PROCEDURE — 99214 PR OFFICE/OUTPT VISIT, EST, LEVL IV, 30-39 MIN: ICD-10-PCS | Mod: S$GLB,,, | Performed by: INTERNAL MEDICINE

## 2021-01-12 PROCEDURE — 1101F PR PT FALLS ASSESS DOC 0-1 FALLS W/OUT INJ PAST YR: ICD-10-PCS | Mod: CPTII,S$GLB,, | Performed by: INTERNAL MEDICINE

## 2021-01-12 PROCEDURE — 1159F PR MEDICATION LIST DOCUMENTED IN MEDICAL RECORD: ICD-10-PCS | Mod: S$GLB,,, | Performed by: INTERNAL MEDICINE

## 2021-01-12 PROCEDURE — 3288F PR FALLS RISK ASSESSMENT DOCUMENTED: ICD-10-PCS | Mod: CPTII,S$GLB,, | Performed by: INTERNAL MEDICINE

## 2021-01-12 PROCEDURE — 99999 PR PBB SHADOW E&M-EST. PATIENT-LVL IV: CPT | Mod: PBBFAC,,, | Performed by: INTERNAL MEDICINE

## 2021-01-14 ENCOUNTER — TELEPHONE (OUTPATIENT)
Dept: HEMATOLOGY/ONCOLOGY | Facility: CLINIC | Age: 81
End: 2021-01-14

## 2021-01-20 ENCOUNTER — IMMUNIZATION (OUTPATIENT)
Dept: FAMILY MEDICINE | Facility: CLINIC | Age: 81
End: 2021-01-20
Payer: MEDICARE

## 2021-01-20 DIAGNOSIS — Z23 NEED FOR VACCINATION: Primary | ICD-10-CM

## 2021-01-20 PROCEDURE — 91300 COVID-19, MRNA, LNP-S, PF, 30 MCG/0.3 ML DOSE VACCINE: CPT | Mod: PBBFAC | Performed by: FAMILY MEDICINE

## 2021-02-10 ENCOUNTER — IMMUNIZATION (OUTPATIENT)
Dept: FAMILY MEDICINE | Facility: CLINIC | Age: 81
End: 2021-02-10
Payer: MEDICARE

## 2021-02-10 DIAGNOSIS — Z23 NEED FOR VACCINATION: Primary | ICD-10-CM

## 2021-02-10 PROCEDURE — 0002A COVID-19, MRNA, LNP-S, PF, 30 MCG/0.3 ML DOSE VACCINE: CPT | Mod: PBBFAC | Performed by: INTERNAL MEDICINE

## 2021-02-10 PROCEDURE — 91300 COVID-19, MRNA, LNP-S, PF, 30 MCG/0.3 ML DOSE VACCINE: CPT | Mod: PBBFAC | Performed by: INTERNAL MEDICINE

## 2021-03-10 DIAGNOSIS — I50.21 ACUTE SYSTOLIC (CONGESTIVE) HEART FAILURE: Primary | ICD-10-CM

## 2021-03-11 RX ORDER — VERAPAMIL HYDROCHLORIDE 120 MG/1
120 CAPSULE, EXTENDED RELEASE ORAL DAILY
Qty: 30 CAPSULE | Refills: 4 | Status: SHIPPED | OUTPATIENT
Start: 2021-03-11 | End: 2021-03-16

## 2021-03-15 ENCOUNTER — TELEPHONE (OUTPATIENT)
Dept: CARDIOLOGY | Facility: CLINIC | Age: 81
End: 2021-03-15

## 2021-03-16 RX ORDER — VERAPAMIL HYDROCHLORIDE 100 MG/1
100 CAPSULE, EXTENDED RELEASE ORAL DAILY
Qty: 90 CAPSULE | Refills: 4 | Status: SHIPPED | OUTPATIENT
Start: 2021-03-16 | End: 2022-06-09

## 2021-05-05 ENCOUNTER — TELEPHONE (OUTPATIENT)
Dept: HEMATOLOGY/ONCOLOGY | Facility: CLINIC | Age: 81
End: 2021-05-05

## 2021-05-05 DIAGNOSIS — Z85.3 HISTORY OF BREAST CANCER: Primary | ICD-10-CM

## 2021-05-05 RX ORDER — ANASTROZOLE 1 MG/1
1 TABLET ORAL DAILY
Qty: 90 TABLET | Refills: 4 | Status: SHIPPED | OUTPATIENT
Start: 2021-05-05

## 2021-06-25 ENCOUNTER — TELEPHONE (OUTPATIENT)
Dept: HEMATOLOGY/ONCOLOGY | Facility: CLINIC | Age: 81
End: 2021-06-25

## 2021-08-27 DIAGNOSIS — Z20.822 ENCOUNTER FOR LABORATORY TESTING FOR COVID-19 VIRUS: ICD-10-CM

## 2021-09-10 ENCOUNTER — LAB VISIT (OUTPATIENT)
Dept: LAB | Facility: OTHER | Age: 81
End: 2021-09-10
Payer: MEDICARE

## 2021-09-10 DIAGNOSIS — Z20.822 ENCOUNTER FOR LABORATORY TESTING FOR COVID-19 VIRUS: ICD-10-CM

## 2021-09-10 PROCEDURE — U0003 INFECTIOUS AGENT DETECTION BY NUCLEIC ACID (DNA OR RNA); SEVERE ACUTE RESPIRATORY SYNDROME CORONAVIRUS 2 (SARS-COV-2) (CORONAVIRUS DISEASE [COVID-19]), AMPLIFIED PROBE TECHNIQUE, MAKING USE OF HIGH THROUGHPUT TECHNOLOGIES AS DESCRIBED BY CMS-2020-01-R: HCPCS | Performed by: NURSE PRACTITIONER

## 2021-09-13 LAB
SARS-COV-2 RNA RESP QL NAA+PROBE: NOT DETECTED
SARS-COV-2- CYCLE NUMBER: NORMAL